# Patient Record
Sex: MALE | Race: OTHER | Employment: OTHER | URBAN - METROPOLITAN AREA
[De-identification: names, ages, dates, MRNs, and addresses within clinical notes are randomized per-mention and may not be internally consistent; named-entity substitution may affect disease eponyms.]

---

## 2021-09-22 NOTE — PATIENT DISCUSSION
Recommend Bilateral upper lid blepharoplasty. Medicare, if pt elects BLL bleph, fat will be removed at the same time (discussed risks and benefits of sx. .. ).

## 2021-09-22 NOTE — PATIENT DISCUSSION
Recommend Mini lower lift, pre-tragel, SMAS to chin(discussed risks and benefits of sx. ..). (if jowling ever becomes a concern).

## 2021-10-12 NOTE — PATIENT DISCUSSION
This visual field clearly demonstrated a minimum of 41% loss of upper field of vision OU, with upper lid skin in repose and elevated by taping of the lid to demonstrate potential correction. This field shows that taping the lids significantly improved this patient's superior field of vision by approximately 41%, OU.

## 2021-11-10 NOTE — PATIENT DISCUSSION
Patient informed of available non-opioid medications and other non-pharmacological options. Discussed advantages and disadvantages of the alternatives, including whether the patient is at-risk for, or has a history of, controlled substance abuse or misuse, and the patient’s personal preferences. [facility] “Opioid Alternative Pamphlet” was provided to patient.

## 2021-12-07 NOTE — PATIENT DISCUSSION
Patient informed of available non-opioid medications and other non-pharmacological options. Discussed advantages and disadvantages of the alternatives, including whether the patient is at-risk for, or has a history of, controlled substance abuse or misuse, and the patient’s personal preferences. [facility] “Opioid Alternative Pamphlet” was provided to patient.

## 2021-12-07 NOTE — PROCEDURE NOTE: SURGICAL
MR #:&nbsp; [id number]<br /><br />PREOPERATIVE DIAGNOSIS:&nbsp; &nbsp; &nbsp;1. Dermatochalasis upper lids; 2. Lower lid fat herniation and excess skin<br /><br />POSTOPERATIVE DIAGNOSIS:&nbsp; &nbsp;Same<br /><br />PROCEDURE:&nbsp; &nbsp;1. Upper lid blepharoplasty both eyes; 2. C02 Laser transconjunctival blepharoplasty lower lids; 3. Skin resurfacing of lower lids<br /><br />ANESTHESIA:&nbsp; &nbsp; &nbsp; Local MAC<br /><br />ESTIMATED BLOOD LOSS:&nbsp; &nbsp; Minimal, &lt;5 cc<br /><br />COMPLICATIONS:&nbsp; &nbsp; None<br /><br />INDICATION:&nbsp; This patient had complaints of heavy skin and muscle of the upper eyelids that comes down over the eyelids and affects vision. &nbsp; Examination complete with a photograph of the patient confirmed extra upper lid skin that hangs over the eye and blocks vision. Superior visual field defects were also documented on [place] visual field with marked improvement in the superior scotomas after taping the lids up. We have discussed that a standard blepharoplasty operation would remove this extra tissue from the upper lids and allow an improvement in their abnormal visual field. Patient understands the risks and benefits, including the risk of ectropion and scleral show and wishes to proceed. This patient also has large herniated fat on the lower lids and redundant skin of the lower lids. We decided to perform a lower blepharoplasty operation to remove lower lid fat bags and to lightly resurface the skin to tighten them up. Patient understands the risks and benefits of the surgery and wishes to proceed. <br /><br />PROCEDURE:&nbsp; The patient was brought to the operating room and laid in the supine position. Prior to surgery, a time-out was performed in the operating room, and the nurse confirmed the patient&rsquo;s identity and name, the side and site of the surgery and the type of surgery and procedure to be performed. &nbsp; I proceeded with the marking of the patient with a marking pen, in the areas of surgery to be performed. The patient had upper lid crease markings drawn out with a marking pen. The skin of the upper lids was then tentatively bunched with two forceps to determine the amount of skin to be removed so the patient could still close their eye and not have lagophthalmos. The superior portion of the blepharoplasty incision line was then marked in an eclipse with a marking pen. The patient was then sedated and injected with Xylocaine 2% with epinephrine. &nbsp; Approximately 6cc&rsquo;s were used for both lids. &nbsp; A drop of Alcaine was placed over both eyes. The patient was then prepped and draped in typical fashion for facial plastic surgery. Laser safe protective corneal lenses were then placed over both eyes. Two forceps were then used to recheck the upper lid measurements and the patient was remarked with a blue marking pen in a spindle shape fashion on each upper lid. A 15 blade was first used to make an incision through each pre-marked area on each upper lid. &nbsp; A Bovie cautery on cutting mode with a [place] tip was then used to remove a skin muscle flap on each upper lid. &nbsp; The orbital septum was opened and the orbital fat selectively removed with the Bovie Cautery from each upper lid. Meticulous hemostasis was achieved. After cauterizing all bleeders, each wound was then closed with running 6-0 nylon sutures. &nbsp; The patient was inspected for contour and shape. An excellent appearance was noted. <br /><br />Attention was turned to the lower blepharoplasty operation where a rake was then inserted into the right lower lid, retracting it downward. A transconjunctival incision was made 2mm below the tarsus with the Bovie cautery. This incision was carried across the extent of the inner lid. Dissection was carried down with the Bovie cautery while pulling upward on the conjunctiva and retractor layer with a forceps. The fat was released from the septum and allowed to herniate forward exposing the nasal, central and temporal fat pads. Care was taken to avoid the inferior oblique muscle. These pads were teased forward and isolated. The fat was removed selectively with a Bovie cautery with excellent hemostasis. &nbsp; After judging adequate fat removal, the left lower lid had similar procedure. The skin was then resurfaced using the resurfacing CO2 laser on a low setting of 16 ness scan mode for an initial pass on both lower lids. This was done to tighten up the skin to remove wrinkles. This patient did have lid laxity so the laser was not aggressively performed on this patient in order to avoid ectropion. A second pass on the malar area was performed with the C02 laser set again at 16 ness. This was done to tighten up the malar area so fluid bags would not collect on the cheeks of this patient. At the end of this procedure the laser lens were removed. The patient was cleaned; antibiotic salve was placed on the wounds. The patient was sent to recovery room in stable condition. <strong></strong><br />

## 2021-12-14 NOTE — PATIENT DISCUSSION
One week post op: lids in good position, no infection, healing well, use erythromycin and Tobradex BID x 7 days then QHS x 7 days. Wear sunglasses and hat while outdoors. Avoid sun exposure.

## 2021-12-14 NOTE — PATIENT DISCUSSION
Recommend Bilateral upper lid blepharoplasty. Medicare, if pt elects BLL bleph, fat will be removed at the same time (discussed risks and benefits of sx. .. ). no

## 2021-12-14 NOTE — PATIENT DISCUSSION
One week post op: great curve and shape, no infection, healing well, sutures intact and removed, use erythromycin BID to upper eyelids x 7 days, QHS  x 7 days. Use Vitamin E oil/Skinuva BID x 1 month to incisions after 14 days. Wear sunglasses and hat while outdoors. Avoid sun exposure. No restrictions in 5 days.

## 2022-01-04 NOTE — PATIENT DISCUSSION
Four week post op: great curve and shape, no infection, healing well. Use Vitamin E oil/Skinuva BID x 1 month to incisions. Wear sunglasses and hat while outdoors. Avoid sun exposure.

## 2022-01-04 NOTE — PATIENT DISCUSSION
Four week post op: lids in good position, no infection, healing well, Wear sunglasses and hat while outdoors. Avoid sun exposure.

## 2022-03-15 ENCOUNTER — EMERGENCY VISIT (OUTPATIENT)
Dept: URBAN - METROPOLITAN AREA CLINIC 16 | Facility: CLINIC | Age: 62
End: 2022-03-15

## 2022-03-15 DIAGNOSIS — H00.021: ICD-10-CM

## 2022-03-15 PROCEDURE — 99203 OFFICE O/P NEW LOW 30 MIN: CPT

## 2022-03-15 ASSESSMENT — VISUAL ACUITY
OD_CC: 20/25-1
OS_CC: 20/20

## 2022-03-15 ASSESSMENT — TONOMETRY
OS_IOP_MMHG: 11
OD_IOP_MMHG: 11

## 2022-03-15 NOTE — PATIENT DISCUSSION
Discussed treatment options since no improvement with BERNARD merissa. Patient ed on Keflex and possible cross rxn due to PCN allergy but risk very low. Will Rx but patient understands to go straight to ER if any hives, difficulty breathing, etc after taking.